# Patient Record
Sex: MALE | Race: WHITE | NOT HISPANIC OR LATINO | Employment: FULL TIME | URBAN - METROPOLITAN AREA
[De-identification: names, ages, dates, MRNs, and addresses within clinical notes are randomized per-mention and may not be internally consistent; named-entity substitution may affect disease eponyms.]

---

## 2017-07-26 ENCOUNTER — TRANSCRIBE ORDERS (OUTPATIENT)
Dept: LAB | Facility: HOSPITAL | Age: 22
End: 2017-07-26

## 2017-07-26 ENCOUNTER — LAB (OUTPATIENT)
Dept: LAB | Facility: HOSPITAL | Age: 22
End: 2017-07-26

## 2017-07-26 DIAGNOSIS — R07.9 CHEST PAIN, UNSPECIFIED: ICD-10-CM

## 2017-07-26 DIAGNOSIS — K21.9 GASTROESOPHAGEAL REFLUX DISEASE, ESOPHAGITIS PRESENCE NOT SPECIFIED: ICD-10-CM

## 2017-07-26 DIAGNOSIS — R07.9 CHEST PAIN, UNSPECIFIED: Primary | ICD-10-CM

## 2017-07-26 LAB
ALBUMIN SERPL-MCNC: 5.1 G/DL (ref 3.2–4.8)
ALBUMIN/GLOB SERPL: 1.8 G/DL (ref 1.5–2.5)
ALP SERPL-CCNC: 70 U/L (ref 25–100)
ALT SERPL W P-5'-P-CCNC: 20 U/L (ref 7–40)
ANION GAP SERPL CALCULATED.3IONS-SCNC: 7 MMOL/L (ref 3–11)
ARTICHOKE IGE QN: 122 MG/DL (ref 0–130)
AST SERPL-CCNC: 24 U/L (ref 0–33)
BASOPHILS # BLD AUTO: 0.02 10*3/MM3 (ref 0–0.2)
BASOPHILS NFR BLD AUTO: 0.3 % (ref 0–1)
BILIRUB SERPL-MCNC: 0.9 MG/DL (ref 0.3–1.2)
BUN BLD-MCNC: 16 MG/DL (ref 9–23)
BUN/CREAT SERPL: 17.8 (ref 7–25)
CALCIUM SPEC-SCNC: 10.2 MG/DL (ref 8.7–10.4)
CHLORIDE SERPL-SCNC: 102 MMOL/L (ref 99–109)
CHOLEST SERPL-MCNC: 191 MG/DL (ref 0–200)
CO2 SERPL-SCNC: 29 MMOL/L (ref 20–31)
CREAT BLD-MCNC: 0.9 MG/DL (ref 0.6–1.3)
DEPRECATED RDW RBC AUTO: 41.6 FL (ref 37–54)
EOSINOPHIL # BLD AUTO: 0.03 10*3/MM3 (ref 0–0.3)
EOSINOPHIL NFR BLD AUTO: 0.4 % (ref 0–3)
ERYTHROCYTE [DISTWIDTH] IN BLOOD BY AUTOMATED COUNT: 12.5 % (ref 11.3–14.5)
GFR SERPL CREATININE-BSD FRML MDRD: 109 ML/MIN/1.73
GLOBULIN UR ELPH-MCNC: 2.8 GM/DL
GLUCOSE BLD-MCNC: 79 MG/DL (ref 70–100)
HCT VFR BLD AUTO: 44 % (ref 38.9–50.9)
HDLC SERPL-MCNC: 63 MG/DL (ref 40–60)
HGB BLD-MCNC: 14.6 G/DL (ref 13.1–17.5)
IMM GRANULOCYTES # BLD: 0.01 10*3/MM3 (ref 0–0.03)
IMM GRANULOCYTES NFR BLD: 0.1 % (ref 0–0.6)
LYMPHOCYTES # BLD AUTO: 2.7 10*3/MM3 (ref 0.6–4.8)
LYMPHOCYTES NFR BLD AUTO: 33.8 % (ref 24–44)
MCH RBC QN AUTO: 30.1 PG (ref 27–31)
MCHC RBC AUTO-ENTMCNC: 33.2 G/DL (ref 32–36)
MCV RBC AUTO: 90.7 FL (ref 80–99)
MONOCYTES # BLD AUTO: 0.71 10*3/MM3 (ref 0–1)
MONOCYTES NFR BLD AUTO: 8.9 % (ref 0–12)
NEUTROPHILS # BLD AUTO: 4.51 10*3/MM3 (ref 1.5–8.3)
NEUTROPHILS NFR BLD AUTO: 56.5 % (ref 41–71)
PLATELET # BLD AUTO: 224 10*3/MM3 (ref 150–450)
PMV BLD AUTO: 10.5 FL (ref 6–12)
POTASSIUM BLD-SCNC: 3.9 MMOL/L (ref 3.5–5.5)
PROT SERPL-MCNC: 7.9 G/DL (ref 5.7–8.2)
RBC # BLD AUTO: 4.85 10*6/MM3 (ref 4.2–5.76)
SODIUM BLD-SCNC: 138 MMOL/L (ref 132–146)
T4 FREE SERPL-MCNC: 1.23 NG/DL (ref 0.89–1.76)
TRIGL SERPL-MCNC: 52 MG/DL (ref 0–150)
TSH SERPL DL<=0.05 MIU/L-ACNC: 4.87 MIU/ML (ref 0.35–5.35)
WBC NRBC COR # BLD: 7.98 10*3/MM3 (ref 4.5–13.5)

## 2017-07-26 PROCEDURE — 84439 ASSAY OF FREE THYROXINE: CPT

## 2017-07-26 PROCEDURE — 85025 COMPLETE CBC W/AUTO DIFF WBC: CPT

## 2017-07-26 PROCEDURE — 36415 COLL VENOUS BLD VENIPUNCTURE: CPT

## 2017-07-26 PROCEDURE — 80061 LIPID PANEL: CPT

## 2017-07-26 PROCEDURE — 80053 COMPREHEN METABOLIC PANEL: CPT

## 2017-07-26 PROCEDURE — 84443 ASSAY THYROID STIM HORMONE: CPT

## 2021-03-19 ENCOUNTER — HOSPITAL ENCOUNTER (EMERGENCY)
Facility: HOSPITAL | Age: 26
Discharge: HOME | End: 2021-03-19
Attending: EMERGENCY MEDICINE | Admitting: EMERGENCY MEDICINE

## 2021-03-19 VITALS
OXYGEN SATURATION: 98 % | HEIGHT: 68 IN | SYSTOLIC BLOOD PRESSURE: 119 MMHG | RESPIRATION RATE: 16 BRPM | DIASTOLIC BLOOD PRESSURE: 76 MMHG | BODY MASS INDEX: 30.31 KG/M2 | TEMPERATURE: 97.8 F | HEART RATE: 82 BPM | WEIGHT: 200 LBS

## 2021-03-19 DIAGNOSIS — F41.9 ANXIETY: ICD-10-CM

## 2021-03-19 DIAGNOSIS — R00.2 PALPITATIONS: Primary | ICD-10-CM

## 2021-03-19 LAB
ALBUMIN SERPL-MCNC: 4.7 G/DL (ref 3.5–5.2)
ALBUMIN/GLOB SERPL: 1.7 G/DL
ALP SERPL-CCNC: 81 U/L (ref 39–117)
ALT SERPL W P-5'-P-CCNC: 45 U/L (ref 1–41)
ANION GAP SERPL CALCULATED.3IONS-SCNC: 8 MMOL/L (ref 5–15)
AST SERPL-CCNC: 29 U/L (ref 1–40)
BASOPHILS # BLD AUTO: 0.02 10*3/MM3 (ref 0–0.2)
BASOPHILS NFR BLD AUTO: 0.3 % (ref 0–1.5)
BILIRUB SERPL-MCNC: 0.4 MG/DL (ref 0–1.2)
BUN SERPL-MCNC: 11 MG/DL (ref 6–20)
BUN/CREAT SERPL: 12.1 (ref 7–25)
CALCIUM SPEC-SCNC: 9.5 MG/DL (ref 8.6–10.5)
CHLORIDE SERPL-SCNC: 103 MMOL/L (ref 98–107)
CO2 SERPL-SCNC: 29 MMOL/L (ref 22–29)
CREAT SERPL-MCNC: 0.91 MG/DL (ref 0.76–1.27)
D DIMER PPP FEU-MCNC: <0.27 MCGFEU/ML (ref 0–0.56)
DEPRECATED RDW RBC AUTO: 39.5 FL (ref 37–54)
EOSINOPHIL # BLD AUTO: 0.06 10*3/MM3 (ref 0–0.4)
EOSINOPHIL NFR BLD AUTO: 0.9 % (ref 0.3–6.2)
ERYTHROCYTE [DISTWIDTH] IN BLOOD BY AUTOMATED COUNT: 11.9 % (ref 12.3–15.4)
GFR SERPL CREATININE-BSD FRML MDRD: 103 ML/MIN/1.73
GLOBULIN UR ELPH-MCNC: 2.8 GM/DL
GLUCOSE SERPL-MCNC: 99 MG/DL (ref 65–99)
HCT VFR BLD AUTO: 47.3 % (ref 37.5–51)
HGB BLD-MCNC: 15.8 G/DL (ref 13–17.7)
IMM GRANULOCYTES # BLD AUTO: 0.03 10*3/MM3 (ref 0–0.05)
IMM GRANULOCYTES NFR BLD AUTO: 0.5 % (ref 0–0.5)
LYMPHOCYTES # BLD AUTO: 1.98 10*3/MM3 (ref 0.7–3.1)
LYMPHOCYTES NFR BLD AUTO: 29.9 % (ref 19.6–45.3)
MCH RBC QN AUTO: 30.4 PG (ref 26.6–33)
MCHC RBC AUTO-ENTMCNC: 33.4 G/DL (ref 31.5–35.7)
MCV RBC AUTO: 91.1 FL (ref 79–97)
MONOCYTES # BLD AUTO: 0.69 10*3/MM3 (ref 0.1–0.9)
MONOCYTES NFR BLD AUTO: 10.4 % (ref 5–12)
NEUTROPHILS NFR BLD AUTO: 3.84 10*3/MM3 (ref 1.7–7)
NEUTROPHILS NFR BLD AUTO: 58 % (ref 42.7–76)
NRBC BLD AUTO-RTO: 0 /100 WBC (ref 0–0.2)
PLATELET # BLD AUTO: 239 10*3/MM3 (ref 140–450)
PMV BLD AUTO: 9.8 FL (ref 6–12)
POTASSIUM SERPL-SCNC: 3.9 MMOL/L (ref 3.5–5.2)
PROT SERPL-MCNC: 7.5 G/DL (ref 6–8.5)
QT INTERVAL: 340 MS
QTC INTERVAL: 436 MS
RBC # BLD AUTO: 5.19 10*6/MM3 (ref 4.14–5.8)
SODIUM SERPL-SCNC: 140 MMOL/L (ref 136–145)
TSH SERPL DL<=0.05 MIU/L-ACNC: 3.84 UIU/ML (ref 0.27–4.2)
WBC # BLD AUTO: 6.62 10*3/MM3 (ref 3.4–10.8)

## 2021-03-19 PROCEDURE — 85379 FIBRIN DEGRADATION QUANT: CPT | Performed by: PHYSICIAN ASSISTANT

## 2021-03-19 PROCEDURE — 99283 EMERGENCY DEPT VISIT LOW MDM: CPT

## 2021-03-19 PROCEDURE — 84443 ASSAY THYROID STIM HORMONE: CPT | Performed by: PHYSICIAN ASSISTANT

## 2021-03-19 PROCEDURE — 93005 ELECTROCARDIOGRAM TRACING: CPT | Performed by: EMERGENCY MEDICINE

## 2021-03-19 PROCEDURE — 85025 COMPLETE CBC W/AUTO DIFF WBC: CPT | Performed by: PHYSICIAN ASSISTANT

## 2021-03-19 PROCEDURE — 80053 COMPREHEN METABOLIC PANEL: CPT | Performed by: PHYSICIAN ASSISTANT

## 2021-03-19 PROCEDURE — 93005 ELECTROCARDIOGRAM TRACING: CPT

## 2021-03-19 RX ORDER — HYDROXYZINE PAMOATE 50 MG/1
50 CAPSULE ORAL NIGHTLY PRN
Qty: 14 CAPSULE | Refills: 0 | Status: SHIPPED | OUTPATIENT
Start: 2021-03-19

## 2021-03-19 RX ORDER — MULTIPLE VITAMINS W/ MINERALS TAB 9MG-400MCG
1 TAB ORAL DAILY
COMMUNITY

## 2021-03-19 NOTE — ED PROVIDER NOTES
Subjective   Mr. Bruce is a 24-year-old male presents the emergency department today when he thinks he is having a panic attack.  He states that he has been having palpitations his heart been racing but is not actually been beating harder than normal he had no chest pain associated with this and no shortness of breath.  He denies any upper respiratory symptoms no loss of smell or taste.  Said no known exposure to Covid has not tested positive up to this point.  He had similar episodes like this in the past.  Has never been evaluated.  He reports he had been on some depression medication probably more than a year ago states he had stopped taking it back in the summer.  He had no abdominal pain associated with this he does not get diaphoretic or short of breath.  Says that that when these episodes occur that they may last for several minutes to an hour.  Nothing seems to alleviate them.  He has been under no new out of the ordinary stressors.  No prior history of thyroid disease.  No other complaints.  He is not a smoker he does not have a history of heart disease he does not have a history of DVT or pulmonary emboli.      History provided by:  Patient   used: No    Palpitations  Palpitations quality:  Fast  Onset quality:  Sudden  Timing:  Intermittent  Progression:  Waxing and waning  Chronicity:  Recurrent  Context: anxiety    Context: not bronchodilators, not caffeine, not dehydration, not exercise, not hyperventilation, not illicit drugs, not nicotine and not stimulant use    Relieved by:  Nothing  Worsened by:  Nothing  Ineffective treatments:  None tried  Associated symptoms: no back pain, no chest pain, no chest pressure, no cough, no diaphoresis, no dizziness, no hemoptysis, no leg pain, no lower extremity edema, no malaise/fatigue, no nausea, no near-syncope, no numbness, no orthopnea, no PND, no shortness of breath, no syncope, no vomiting and no weakness    Risk factors: no diabetes  mellitus, no heart disease, no hx of atrial fibrillation, no hx of DVT, no hx of PE, no hx of thyroid disease, no hyperthyroidism and no OTC sinus medications        Review of Systems   Constitutional: Negative for diaphoresis, fever and malaise/fatigue.   Respiratory: Negative for cough, hemoptysis, chest tightness, shortness of breath and wheezing.    Cardiovascular: Positive for palpitations. Negative for chest pain, orthopnea, syncope, PND and near-syncope.   Gastrointestinal: Negative for nausea and vomiting.   Musculoskeletal: Negative for back pain.   Neurological: Negative for dizziness, weakness and numbness.   Psychiatric/Behavioral: Negative.    All other systems reviewed and are negative.      History reviewed. No pertinent past medical history.    No Known Allergies    Past Surgical History:   Procedure Laterality Date   • DENTAL PROCEDURE     • EAR TUBES         History reviewed. No pertinent family history.    Social History     Socioeconomic History   • Marital status: Single     Spouse name: Not on file   • Number of children: Not on file   • Years of education: Not on file   • Highest education level: Not on file   Tobacco Use   • Smoking status: Passive Smoke Exposure - Never Smoker   Substance and Sexual Activity   • Alcohol use: Yes     Comment: more than once a week- 5 days a week, 6 pack of beer   • Drug use: Yes     Types: Marijuana     Comment: today and yesterday   • Sexual activity: Yes           Objective   Physical Exam  Vitals and nursing note reviewed.   Constitutional:       Appearance: He is well-developed.      Comments: Warm pink dry afebrile nontoxic.   HENT:      Head: Normocephalic and atraumatic.      Right Ear: External ear normal.      Left Ear: External ear normal.      Nose: Nose normal.   Eyes:      General: No scleral icterus.     Conjunctiva/sclera: Conjunctivae normal.      Pupils: Pupils are equal, round, and reactive to light.   Neck:      Thyroid: No thyromegaly.    Cardiovascular:      Rate and Rhythm: Regular rhythm. Tachycardia present.      Heart sounds: Normal heart sounds.   Pulmonary:      Effort: Pulmonary effort is normal. No respiratory distress.      Breath sounds: Normal breath sounds. No wheezing or rales.   Chest:      Chest wall: No tenderness.   Abdominal:      General: Bowel sounds are normal. There is no distension.      Palpations: Abdomen is soft.      Tenderness: There is no abdominal tenderness.   Musculoskeletal:         General: Normal range of motion.      Cervical back: Normal range of motion.   Lymphadenopathy:      Cervical: No cervical adenopathy.   Skin:     General: Skin is warm and dry.   Neurological:      Mental Status: He is alert and oriented to person, place, and time.      Cranial Nerves: No cranial nerve deficit.      Coordination: Coordination normal.      Deep Tendon Reflexes: Reflexes are normal and symmetric. Reflexes normal.   Psychiatric:         Behavior: Behavior normal.         Thought Content: Thought content normal.         Judgment: Judgment normal.         Procedures           ED Course  ED Course as of Mar 20 1742   Fri Mar 19, 2021   1154 Discussed the findings with the patient.  His D-dimer was undetectable.  He is EKG was unremarkable as well.  Rest of his labs are unremarkable.  Again refer him to the cardiac clinic for Holter monitor.    [DANIELLE]   1154 He does has been having difficulty sleeping at night.  Will give him some to see if it does not help and I have him follow-up with a PMD for outpatient follow-up.    [DANIELLE]      ED Course User Index  [DANIELLE] Autsyn Grissom PA                                   No results found for this or any previous visit (from the past 24 hour(s)).  Note: In addition to lab results from this visit, the labs listed above may include labs taken at another facility or during a different encounter within the last 24 hours. Please correlate lab times with ED admission and discharge times for  "further clarification of the services performed during this visit.    No orders to display     Vitals:    03/19/21 1040 03/19/21 1130 03/19/21 1200   BP: 147/86 128/79 119/76   BP Location: Left arm     Patient Position: Sitting     Pulse: 115 91 82   Resp: 16  16   Temp: 97.8 °F (36.6 °C)     TempSrc: Infrared     SpO2: 98% 99% 98%   Weight: 90.7 kg (200 lb)     Height: 172.7 cm (68\")       Medications - No data to display  ECG/EMG Results (last 24 hours)     Procedure Component Value Units Date/Time    ECG 12 Lead [[id number]] Collected: 03/19/21 1047     Updated: 03/19/21 1153        ECG 12 Lead   Final Result   Test Reason : PALPITATIONS   Blood Pressure : **/** mmHG   Vent. Rate : 099 BPM     Atrial Rate : 099 BPM      P-R Int : 140 ms          QRS Dur : 100 ms       QT Int : 340 ms       P-R-T Axes : 067 072 057 degrees      QTc Int : 436 ms      ** Poor data quality, interpretation may be adversely affected   Normal sinus rhythm   Incomplete right bundle branch block   Borderline ECG   No previous ECGs available   Confirmed by DEBORAH CALHOUN ([id number]) on 3/19/2021 9:21:27 PM      Referred By:  EDMD           Confirmed By:DEBORAH CALHOUN                  MDM  Number of Diagnoses or Management Options  Anxiety: new and requires workup  Palpitations: new and requires workup     Amount and/or Complexity of Data Reviewed  Clinical lab tests: ordered and reviewed  Tests in the radiology section of CPT®: ordered and reviewed  Tests in the medicine section of CPT®: reviewed and ordered  Discuss the patient with other providers: yes    Patient Progress  Patient progress: stable      Final diagnoses:   Palpitations   Anxiety       ED Disposition  ED Disposition     ED Disposition Condition Comment    Discharge Stable           [facility] CARDIOLOGY  [address]  [facility] [id number] [phone]             Medication List      New Prescriptions    hydrOXYzine pamoate 50 MG " capsule  Commonly known as: VISTARIL  Take 1 capsule by mouth At Night As Needed for Anxiety.           Where to Get Your Medications      These medications were sent to [id number] - [place], [place] - [address] - [phone]  - [phone]  [address] [address], [facility] [address]    Phone: [phone]   · hydrOXYzine pamoate 50 MG capsule          Austyn Grissom PA  03/20/21 1208

## 2021-03-19 NOTE — DISCHARGE INSTRUCTIONS
Follow up with one of the [facility] Primary Care Providers below to setup primary care. If you need assistance coordinating a primary care appointment with a [facility] Primary Care Provider, please contact the Primary Care Coordinators at [phone] for appointment scheduling.    [facility], Primary Care   [address], [address]   [place], [place] [address] [phone]    [facility] Internal Medicine & Endocrinology  [address], [address]  [place] [address] [phone]    [facility] Family Medicine  [address], [address]   [place], [place] [address] [phone]    [facility] Primary Care  [address], [address]  [place], [place] [address] [phone]    [facility], Primary Care,   [address], [address]   [place], [place] [address] [phone]    [facility] Primary Care  [address], [address]  [place], [place] [address] [phone]    [facility], Primary Care  [address], [address]  [place], [place] [id number] [phone]    [facility] Internal Medicine & Pediatrics  [address]   [place] [address] [phone]    [facility], Primary Care  [address] [address] [phone]      [facility] Primary Care  [address], [address]   [place] [address] [phone]    [facility] Family Medicine  [address] [place] [address] [phone]  Follow up with one of the physician centers below to setup primary care.    [facility], [phone], [address], [address], [place], [facility]    [facility], [phone], [address]  [address]    [facility], [phone], [address] [place] [address];     [facility], [phone], [address] [place] [address]

## 2021-03-25 ENCOUNTER — TELEPHONE (OUTPATIENT)
Dept: CARDIOLOGY | Facility: HOSPITAL | Age: 26
End: 2021-03-25

## 2021-03-25 NOTE — TELEPHONE ENCOUNTER
Attempted to contact patient to ask if he has ever seen a cardiologist before or had any cardiology testing. Left VM for  patient to return call.

## 2021-03-30 ENCOUNTER — OFFICE VISIT (OUTPATIENT)
Dept: CARDIOLOGY | Facility: HOSPITAL | Age: 26
End: 2021-03-30

## 2021-03-30 ENCOUNTER — HOSPITAL ENCOUNTER (OUTPATIENT)
Dept: CARDIOLOGY | Facility: HOSPITAL | Age: 26
Discharge: HOME | End: 2021-03-30

## 2021-03-30 VITALS
DIASTOLIC BLOOD PRESSURE: 79 MMHG | WEIGHT: 197.25 LBS | OXYGEN SATURATION: 98 % | HEIGHT: 68 IN | BODY MASS INDEX: 29.89 KG/M2 | HEART RATE: 61 BPM | TEMPERATURE: 98.1 F | SYSTOLIC BLOOD PRESSURE: 123 MMHG | RESPIRATION RATE: 16 BRPM

## 2021-03-30 DIAGNOSIS — R00.2 PALPITATIONS: Primary | ICD-10-CM

## 2021-03-30 DIAGNOSIS — F41.9 ANXIETY: ICD-10-CM

## 2021-03-30 DIAGNOSIS — R00.2 PALPITATIONS: ICD-10-CM

## 2021-03-30 PROCEDURE — 99203 OFFICE O/P NEW LOW 30 MIN: CPT | Performed by: NURSE PRACTITIONER

## 2021-03-30 PROCEDURE — 93246 EXT ECG>7D<15D RECORDING: CPT

## 2021-03-30 NOTE — PROGRESS NOTES
[facility] Heart Monitor Documentation    Dequan Bruce  1995  [id number]  03/30/21    ANGELES Daugherty    [] ZIO XT Patch  Model [id number] Prescribed for N/A Days    · Serial Number: (N + 9 Digits) N   · Apply-By Date on Box:   · USPS Tracking Number:   · USPS Tracking        [] [employer] MINI PLUS Mobile Cardiac Telemetry  Model BGMINIPLUS Prescribed for N/A Days    · Serial Number: (BGM + 7 Digits) BGM  · Shipped-By Date on Box:   · UPS Tracking Number: [id number]  · UPS Tracking      [x] [employer] MINI Holter Monitor  Model BGMINIEL Prescribed for 14 Days    · Serial Number: (7 Digits) [id number]  · Shipped-By Date on Box: 03/25/21  · UPS Tracking Number: [id number]  · UPS Tracking        This monitor was applied to the patient's chest and checked for proper functioning.  Mr. Dequan Bruce was instructed in the proper use of this monitor.  He was given the opportunity to ask questions and left the office with the device 's instruction manual.    Oneyda Medina LPN, 14:39 EDT, 03/30/21                  [facility] 8.8.2019

## 2021-03-30 NOTE — PROGRESS NOTES
"[facility] Heart and Vascular    Chief Complaint  Establish Care and Palpitations    Subjective    History of Present Illness {CC  Problem List  Visit  Diagnosis   Encounters  Notes  Medications  Labs  Result Review Imaging  Media :23}     Dequan Bruce presents to [facility] CARDIOLOGY for   History of Present Illness     24-year-old male presented to [facility] ED on 3/19/2021.  Complaining of palpitations and panic attack.  Racing heart rate.  Denies chest pain, pressure, dyspnea.  Similar episodes in the past.  No previous cardiac evaluation.  History of anxiety and depression had been on antidepression medication a year ago.  Stopped taking last summer.  Duration of episodes can be several minutes to an hour.  Patient was started on hydroxyzine at night for anxiety and sleep.  Patient has been struggling with poor sleep quality.        Objective     Vital Signs:   Vitals:    03/30/21 1406 03/30/21 1407 03/30/21 1408   BP: 130/78 117/79 123/79   BP Location: Right arm Left arm Left arm   Patient Position: Sitting Standing Sitting   Cuff Size: Adult Adult Adult   Pulse: 66 65 61   Resp:   16   Temp:   98.1 °F (36.7 °C)   TempSrc:   Temporal   SpO2: 98% 97% 98%   Weight:   89.5 kg (197 lb 4 oz)   Height:   172.7 cm (68\")     Body mass index is 29.99 kg/m².  Physical Exam  Vitals reviewed.   Constitutional:       General: He is not in acute distress.  HENT:      Mouth/Throat:      Mouth: Mucous membranes are moist.   Eyes:      General: No scleral icterus.     Conjunctiva/sclera: Conjunctivae normal.   Neck:      Vascular: No carotid bruit.   Cardiovascular:      Rate and Rhythm: Normal rate and regular rhythm.      Pulses:           Radial pulses are 2+ on the right side.        Dorsalis pedis pulses are 2+ on the right side.        Posterior tibial pulses are 2+ on the right side.      Heart sounds: Normal heart sounds.   Pulmonary:      " Effort: Pulmonary effort is normal.      Breath sounds: Normal breath sounds.   Abdominal:      General: There is no distension.      Palpations: Abdomen is soft.      Tenderness: There is no abdominal tenderness.   Musculoskeletal:      Right lower leg: No edema.      Left lower leg: No edema.   Skin:     General: Skin is warm and dry.      Coloration: Skin is not jaundiced.   Neurological:      Mental Status: He is alert and oriented to person, place, and time.   Psychiatric:         Attention and Perception: Attention normal.         Mood and Affect: Mood and affect normal.         Speech: Speech normal.         Behavior: Behavior normal. Behavior is cooperative.         Thought Content: Thought content normal.              Result Review  Data Reviewed:{ Labs  Result Review  Imaging  Med Tab  Media :23}     Admission on 03/19/2021, Discharged on 03/19/2021   Component Date Value Ref Range Status   • QT Interval 03/19/2021 340  ms Final   • QTC Interval 03/19/2021 436  ms Final   • Glucose 03/19/2021 99  65 - 99 mg/dL Final   • BUN 03/19/2021 11  6 - 20 mg/dL Final   • Creatinine 03/19/2021 0.91  0.76 - 1.27 mg/dL Final   • Sodium 03/19/2021 140  136 - 145 mmol/L Final   • Potassium 03/19/2021 3.9  3.5 - 5.2 mmol/L Final    Slight hemolysis detected by analyzer. Results may be affected.   • Chloride 03/19/2021 103  98 - 107 mmol/L Final   • CO2 03/19/2021 29.0  22.0 - 29.0 mmol/L Final   • Calcium 03/19/2021 9.5  8.6 - 10.5 mg/dL Final   • Total Protein 03/19/2021 7.5  6.0 - 8.5 g/dL Final   • Albumin 03/19/2021 4.70  3.50 - 5.20 g/dL Final   • ALT (SGPT) 03/19/2021 45* 1 - 41 U/L Final   • AST (SGOT) 03/19/2021 29  1 - 40 U/L Final   • Alkaline Phosphatase 03/19/2021 81  39 - 117 U/L Final   • Total Bilirubin 03/19/2021 0.4  0.0 - 1.2 mg/dL Final   • eGFR Non African Amer 03/19/2021 103  >60 mL/min/1.73 Final   • Globulin 03/19/2021 2.8  gm/dL Final   • A/G Ratio 03/19/2021 1.7  g/dL Final   • BUN/Creatinine  Ratio 03/19/2021 12.1  7.0 - 25.0 Final   • Anion Gap 03/19/2021 8.0  5.0 - 15.0 mmol/L Final   • TSH 03/19/2021 3.840  0.270 - 4.200 uIU/mL Final   • WBC 03/19/2021 6.62  3.40 - 10.80 10*3/mm3 Final   • RBC 03/19/2021 5.19  4.14 - 5.80 10*6/mm3 Final   • Hemoglobin 03/19/2021 15.8  13.0 - 17.7 g/dL Final   • Hematocrit 03/19/2021 47.3  37.5 - 51.0 % Final   • MCV 03/19/2021 91.1  79.0 - 97.0 fL Final   • MCH 03/19/2021 30.4  26.6 - 33.0 pg Final   • MCHC 03/19/2021 33.4  31.5 - 35.7 g/dL Final   • RDW 03/19/2021 11.9* 12.3 - 15.4 % Final   • RDW-SD 03/19/2021 39.5  37.0 - 54.0 fl Final   • MPV 03/19/2021 9.8  6.0 - 12.0 fL Final   • Platelets 03/19/2021 239  140 - 450 10*3/mm3 Final   • Neutrophil % 03/19/2021 58.0  42.7 - 76.0 % Final   • Lymphocyte % 03/19/2021 29.9  19.6 - 45.3 % Final   • Monocyte % 03/19/2021 10.4  5.0 - 12.0 % Final   • Eosinophil % 03/19/2021 0.9  0.3 - 6.2 % Final   • Basophil % 03/19/2021 0.3  0.0 - 1.5 % Final   • Immature Grans % 03/19/2021 0.5  0.0 - 0.5 % Final   • Neutrophils, Absolute 03/19/2021 3.84  1.70 - 7.00 10*3/mm3 Final   • Lymphocytes, Absolute 03/19/2021 1.98  0.70 - 3.10 10*3/mm3 Final   • Monocytes, Absolute 03/19/2021 0.69  0.10 - 0.90 10*3/mm3 Final   • Eosinophils, Absolute 03/19/2021 0.06  0.00 - 0.40 10*3/mm3 Final   • Basophils, Absolute 03/19/2021 0.02  0.00 - 0.20 10*3/mm3 Final   • Immature Grans, Absolute 03/19/2021 0.03  0.00 - 0.05 10*3/mm3 Final   • nRBC 03/19/2021 0.0  0.0 - 0.2 /100 WBC Final   • D-Dimer, Quantitative 03/19/2021 <0.27  0.00 - 0.56 MCGFEU/mL Final       Assessment and Plan {CC Problem List  Visit Diagnosis  ROS  Review (Popup)  Health Maintenance  Quality  BestPractice  Medications  SmartSets  SnapShot Encounters  Media :23}   1. Palpitations    - Holter Monitor - 72 Hour Up To 15 Days; Future    2. Anxiety  Improved on hydroxyzine.  Encouraged to f/u with PCP for continued management.           Follow Up {Instructions Charge  Capture  Follow-up Communications :23}   Return for Video visit, palpitations, monitor results.    Patient was given instructions and counseling regarding his condition or for health maintenance advice. Please see specific information pulled into the AVS if appropriate.  Patient was instructed to call the [facility] with any questions, concerns, or worsening symptoms.    *Please note that portions of this note were completed with a voice recognition program. Efforts were made to edit the dictations, but occasionally words are mistranscribed.

## 2021-04-28 PROBLEM — R00.2 PALPITATIONS: Status: ACTIVE | Noted: 2021-04-28

## 2021-04-28 PROCEDURE — 93248 EXT ECG>7D<15D REV&INTERPJ: CPT | Performed by: INTERNAL MEDICINE

## 2021-05-09 PROCEDURE — U0004 COV-19 TEST NON-CDC HGH THRU: HCPCS | Performed by: NURSE PRACTITIONER

## 2021-05-10 ENCOUNTER — TELEPHONE (OUTPATIENT)
Dept: URGENT CARE | Facility: CLINIC | Age: 26
End: 2021-05-10

## 2021-05-11 ENCOUNTER — TELEPHONE (OUTPATIENT)
Dept: URGENT CARE | Facility: CLINIC | Age: 26
End: 2021-05-11

## 2021-05-19 ENCOUNTER — TELEMEDICINE (OUTPATIENT)
Dept: CARDIOLOGY | Facility: HOSPITAL | Age: 26
End: 2021-05-19

## 2021-05-19 VITALS
DIASTOLIC BLOOD PRESSURE: 73 MMHG | SYSTOLIC BLOOD PRESSURE: 126 MMHG | BODY MASS INDEX: 29.18 KG/M2 | HEART RATE: 70 BPM | WEIGHT: 197 LBS | HEIGHT: 69 IN

## 2021-05-19 DIAGNOSIS — R00.2 PALPITATIONS: Primary | ICD-10-CM

## 2021-05-19 DIAGNOSIS — F41.9 ANXIETY: ICD-10-CM

## 2021-05-19 PROCEDURE — 99213 OFFICE O/P EST LOW 20 MIN: CPT | Performed by: NURSE PRACTITIONER

## 2021-05-19 NOTE — PROGRESS NOTES
"[facility] Heart and Vascular    This was an audio and video enabled telemedicine encounter.    Chief Complaint  Follow-up (monitor results)    Subjective    History of Present Illness {CC  Problem List  Visit  Diagnosis   Encounters  Notes  Medications  Labs  Result Review Imaging  Media :23}     Dequan Bruce presents to [facility] CARDIOLOGY for   History of Present Illness     25-year-old male with history of anxiety and depression.  Follow-up today on palpitations.  Patient also struggling with poor quality of sleep.      Since last office visit patient was started on Lexapro.      Pt reports palpitations have resolved.  Anxiety better controlled.  Still having problems with sleep quality, but stable.  No CP, pressure, dizziness, syncope, dyspnea.     Patient Active Problem List    Diagnosis Date Noted   • Palpitations 04/28/2021     Note Last Updated: 4/28/2021     · Extended Holter 3/30/2021: Where time 13-day.  Average heart rate 89 bpm.  Rare PACs.  No significant arrhythmias.  Patient events occurred with sinus, sinus tach, PACs         Objective     Vital Signs:   Vitals:    05/19/21 1251   BP: 126/73   BP Location: Right arm   Patient Position: Sitting   Cuff Size: Adult   Pulse: 70   Weight: 89.4 kg (197 lb)   Height: 175.3 cm (69\")     Body mass index is 29.09 kg/m².  Physical Exam  Vitals reviewed.   Constitutional:       General: He is not in acute distress.  Pulmonary:      Effort: Pulmonary effort is normal.   Skin:     Coloration: Skin is not pale.   Neurological:      Mental Status: He is alert.   Psychiatric:         Mood and Affect: Mood normal.         Behavior: Behavior normal. Behavior is cooperative.              Result Review  Data Reviewed:{ Labs  Result Review  Imaging  Med Tab  Media :23}     Lab Results   Component Value Date    WBC 6.62 03/19/2021    HGB 15.8 03/19/2021    HCT 47.3 03/19/2021    MCV 91.1 03/19/2021 "     03/19/2021     Lab Results   Component Value Date    GLUCOSE 99 03/19/2021    CALCIUM 9.5 03/19/2021     03/19/2021    K 3.9 03/19/2021    CO2 29.0 03/19/2021     03/19/2021    BUN 11 03/19/2021    CREATININE 0.91 03/19/2021    EGFRIFNONA 103 03/19/2021    BCR 12.1 03/19/2021    ANIONGAP 8.0 03/19/2021     Lab Results   Component Value Date    TSH 3.840 03/19/2021       Assessment and Plan {CC Problem List  Visit Diagnosis  ROS  Review (Popup)  Health Maintenance  Quality  BestPractice  Medications  SmartSets  SnapShot Encounters  Media :23}   1. Palpitations  Resolved  acceptable holter.      2. Anxiety  Doing well on lexapro  Followed by PCP.     F/u with PCP routinely.  F/u in [facility] as needed.       I spent 15 minutes caring for Dequan on this date of service. This time includes time spent by me in the following activities:preparing for the visit, reviewing tests, performing a medically appropriate examination and/or evaluation , counseling and educating the patient/family/caregiver and documenting information in the medical record    Follow Up {Instructions Charge Capture  Follow-up Communications :23}   Return if symptoms worsen or fail to improve.    Patient was given instructions and counseling regarding his condition or for health maintenance advice. Please see specific information pulled into the AVS if appropriate.  Patient was instructed to call the [facility] with any questions, concerns, or worsening symptoms.    *Please note that portions of this note were completed with a voice recognition program. Efforts were made to edit the dictations, but occasionally words are mistranscribed.

## 2024-07-29 ENCOUNTER — OFFICE VISIT (OUTPATIENT)
Dept: SLEEP MEDICINE | Age: 29
End: 2024-07-29
Payer: COMMERCIAL

## 2024-07-29 VITALS
TEMPERATURE: 97.3 F | HEART RATE: 70 BPM | DIASTOLIC BLOOD PRESSURE: 72 MMHG | HEIGHT: 69 IN | SYSTOLIC BLOOD PRESSURE: 110 MMHG | BODY MASS INDEX: 33 KG/M2 | WEIGHT: 222.8 LBS | OXYGEN SATURATION: 98 %

## 2024-07-29 DIAGNOSIS — R06.83 SNORING: ICD-10-CM

## 2024-07-29 DIAGNOSIS — G47.19 EXCESSIVE DAYTIME SLEEPINESS: ICD-10-CM

## 2024-07-29 DIAGNOSIS — G47.33 OBSTRUCTIVE SLEEP APNEA, ADULT: Primary | ICD-10-CM

## 2024-07-29 DIAGNOSIS — E66.9 OBESITY (BMI 30-39.9): ICD-10-CM

## 2024-07-29 PROBLEM — F41.9 ANXIETY: Status: ACTIVE | Noted: 2019-07-15

## 2024-07-29 PROBLEM — F32.A DEPRESSIVE DISORDER: Status: ACTIVE | Noted: 2019-06-27

## 2024-07-29 PROCEDURE — 99204 OFFICE O/P NEW MOD 45 MIN: CPT | Performed by: INTERNAL MEDICINE

## 2024-07-29 RX ORDER — BUPROPION HYDROCHLORIDE 150 MG/1
1 TABLET ORAL DAILY
COMMUNITY
Start: 2024-04-22

## 2024-07-29 RX ORDER — ESCITALOPRAM OXALATE 20 MG/1
1 TABLET ORAL DAILY
COMMUNITY
Start: 2024-07-08

## 2024-07-29 NOTE — PROGRESS NOTES
Dequan Bruce is a 28 y.o. male.   Chief Complaint   Patient presents with    Sleeping Problem     New pt , PCP recommend sleep study        HPI     28 y.o. male seen in consultation at the request of Karen Quinonez APRN for evaluation of the above.     28-year-old male who has a 4 to 5-year history of increasing snoring.  He had a sleep study previously.  This was at [facility] in 2021.  I see notations of his visit there and external charts but I do not have specific records.  Basically tells me that he had a home sleep apnea test and it was not diagnostic of sleep apnea.    He has had ongoing problems with snoring.  He does not think he is dangerously sleepy during the day but does admit that he is not typically rested in the morning and only feels that way on some slight days.  He does not note frequent awakenings at night.  He thinks he averages 7.5 hours of sleep per night.  It does take him around 1 or 2 hours to initiate sleep and this is a chronic problem for him.  He does awaken with a dry mouth and sore throat.  His bed partner notes snoring that is generally continuous and in all positions.    He denies any RLS type symptoms.  He has not been noted to have any parasomnias.  He notes no nocturnal hallucinations or sleep paralysis.  He has no symptoms that would suggest cataplexy during the day.    [place] Scale is: 5/24    The patient's relevant past medical, surgical, family, and social history reviewed and updated in Epic as appropriate.    Current medications are:   Current Outpatient Medications:     buPROPion XL (WELLBUTRIN XL) 150 MG 24 hr tablet, Take 1 tablet by mouth Daily., Disp: , Rfl:     escitalopram (LEXAPRO) 20 MG tablet, Take 1 tablet by mouth Daily., Disp: , Rfl:     multivitamin with minerals (MULTIVITAMIN ADULT PO), Take 1 tablet by mouth Daily., Disp: , Rfl: .    Review of Systems    Review of Systems  ROS documented in patient questionnaire ×14 systems.  Reviewed with patient.   "Otherwise negative except as noted in HPI.    Physical Exam    Blood pressure 110/72, pulse 70, temperature 97.3 °F (36.3 °C), temperature source Infrared, height 175.3 cm (69.02\"), weight 101 kg (222 lb 12.8 oz), SpO2 98%. Body mass index is 32.89 kg/m².    Physical Exam  Vitals and nursing note reviewed.   Constitutional:       Appearance: Normal appearance. He is well-developed.   HENT:      Head: Normocephalic and atraumatic.      Nose: Nose normal.      Mouth/Throat:      Mouth: Mucous membranes are moist.      Pharynx: Oropharynx is clear. No oropharyngeal exudate.      Comments: Class II airway  Narrowed posterior pharyngeal space  Eyes:      General: No scleral icterus.     Conjunctiva/sclera: Conjunctivae normal.   Neck:      Thyroid: No thyromegaly.      Trachea: No tracheal deviation.   Cardiovascular:      Rate and Rhythm: Normal rate and regular rhythm.      Heart sounds: No murmur heard.     No friction rub. No gallop.   Pulmonary:      Effort: Pulmonary effort is normal. No respiratory distress.      Breath sounds: No wheezing or rales.   Musculoskeletal:         General: No deformity. Normal range of motion.   Skin:     General: Skin is warm and dry.      Findings: No rash.   Neurological:      Mental Status: He is alert and oriented to person, place, and time.   Psychiatric:         Behavior: Behavior normal.         Thought Content: Thought content normal.         DATA:    Reviewed note from ANGELES Blancas dated 4/26/2024    Reviewed bed partner questionnaire    ASSESSMENT:    Problem List Items Addressed This Visit    None  Visit Diagnoses       Obstructive sleep apnea, adult    -  Primary    Relevant Orders    Home Sleep Study    Snoring        Relevant Orders    Home Sleep Study    Excessive daytime sleepiness        Relevant Orders    Home Sleep Study    Obesity (BMI 30-39.9)                28-year-old male referred to the sleep center for snoring and diminished sleep quality.  He has a " decreased posterior pharyngeal space and elevated BMI.  I think he is at increased risk for the presence of obstructive sleep apnea and this is the most likely sleep diagnosis.  Therefore, I have recommended a home sleep apnea test and we will proceed with that at his earliest convenience.  I went over the diagnostic process as well as treatment options as outlined below.    PLAN:    - Home sleep apnea testing.  - Diagnostic process and potential treatment options discussed and questions/concerns addressed.  - Long-term cardiovascular and metabolic risks of untreated obstructive sleep apnea reviewed with the patient.  - The importance of long-term healthy weight loss discussed.  - Patient was agreeable to a trial of CPAP therapy on an initial trial basis if recommended after testing complete.  - Sleep center follow-up.    I have reviewed the results of my evaluation and impression and discussed my recommendations in detail with the patient.    Signed by  Melvin Zambrano MD    July 29, 2024      CC: Karen Quinonez APRN Soper, Erin, APRN

## 2024-08-02 ENCOUNTER — PATIENT ROUNDING (BHMG ONLY) (OUTPATIENT)
Dept: SLEEP MEDICINE | Age: 29
End: 2024-08-02
Payer: COMMERCIAL

## 2024-08-02 NOTE — PROGRESS NOTES
August 2, 2024    Hello, may I speak with Dequan Bruce?    My name is Merry      I am  with [facility]  [address] [address] [phone].    Before we get started may I verify your date of birth? 1995    I am calling to officially welcome you to our practice and ask about your recent visit. Is this a good time to talk? yes    Tell me about your visit with us. What things went well?  Everyone was very helpful and nice.       We're always looking for ways to make our patients' experiences even better. Do you have recommendations on ways we may improve?  no    Overall were you satisfied with your first visit to our practice? yes       I appreciate you taking the time to speak with me today. Is there anything else I can do for you? no      Thank you, and have a great day.

## 2024-09-12 ENCOUNTER — HOSPITAL ENCOUNTER (OUTPATIENT)
Dept: SLEEP MEDICINE | Facility: HOSPITAL | Age: 29
End: 2024-09-12
Payer: COMMERCIAL

## 2024-09-12 VITALS — HEIGHT: 69 IN | BODY MASS INDEX: 32.98 KG/M2 | WEIGHT: 222.66 LBS

## 2024-09-12 DIAGNOSIS — G47.19 EXCESSIVE DAYTIME SLEEPINESS: ICD-10-CM

## 2024-09-12 DIAGNOSIS — R06.83 SNORING: ICD-10-CM

## 2024-09-12 DIAGNOSIS — G47.33 OBSTRUCTIVE SLEEP APNEA, ADULT: ICD-10-CM

## 2024-09-12 PROCEDURE — 95800 SLP STDY UNATTENDED: CPT

## 2024-09-13 DIAGNOSIS — G47.33 OSA (OBSTRUCTIVE SLEEP APNEA): Primary | ICD-10-CM

## 2024-09-13 DIAGNOSIS — R06.83 SNORING: ICD-10-CM
